# Patient Record
Sex: FEMALE | Race: AMERICAN INDIAN OR ALASKA NATIVE
[De-identification: names, ages, dates, MRNs, and addresses within clinical notes are randomized per-mention and may not be internally consistent; named-entity substitution may affect disease eponyms.]

---

## 2019-04-05 ENCOUNTER — HOSPITAL ENCOUNTER (EMERGENCY)
Dept: HOSPITAL 5 - ED | Age: 59
Discharge: HOME | End: 2019-04-05
Payer: COMMERCIAL

## 2019-04-05 VITALS — SYSTOLIC BLOOD PRESSURE: 138 MMHG | DIASTOLIC BLOOD PRESSURE: 93 MMHG
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
## 2019-04-05 DIAGNOSIS — Z90.710: ICD-10-CM
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
## 2019-04-05 DIAGNOSIS — Z98.890: ICD-10-CM

## 2019-04-05 DIAGNOSIS — I10: ICD-10-CM

## 2019-04-05 DIAGNOSIS — S16.1XXA: Primary | ICD-10-CM

## 2019-04-05 DIAGNOSIS — S39.012A: ICD-10-CM

## 2019-04-05 PROCEDURE — 72040 X-RAY EXAM NECK SPINE 2-3 VW: CPT

## 2019-04-05 PROCEDURE — 96372 THER/PROPH/DIAG INJ SC/IM: CPT

## 2019-04-05 PROCEDURE — 99283 EMERGENCY DEPT VISIT LOW MDM: CPT

## 2019-04-05 PROCEDURE — 72100 X-RAY EXAM L-S SPINE 2/3 VWS: CPT

## 2019-04-05 NOTE — XRAY REPORT
PROCEDURE: LUMBAR SPINE, 2 VIEWS 

 

TECHNIQUE:  Lumbar spine radiographs, frontal and lateral views. CPT 46710 

 

HISTORY: Back pain 

 

COMPARISONS:  None . 

 

FINDINGS: 

 

Alignment:  Normal . 

Vertebral body heights/Disk spaces:  Normal . 

Fracture(s):  None . 

Facets:  Normal . 

Bone mineralization:  Normal . 

 

IMPRESSION:  Normal Examination . 

 

This document is electronically signed by Ba Baer MD., April 5 2019 03:52:37 AM ET
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
## 2019-04-05 NOTE — XRAY REPORT
PROCEDURE: XR SPINE CERVICAL 2-3V 

 

TECHNIQUE:  Cervical spine complete, including AP, lateral, open-mouth odontoid, oblique and flexion 
and extension studies.  

 

HISTORY: neck pain 

 

COMPARISONS:  None . 

 

FINDINGS: 

 

Prevertebral soft tissues:  Normal . 

Alignment in neutral position:  Normal . 

Vertebral body movement with flexion and extension:   Physiologic . 

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 There is degenerative narro
wing of the disc spaces at C4-C5 and C5-C6. . 

Fracture(s):  None . 

Neural foramina:  Normal . 

Facets:  Normal  . 

Bone mineralization:  Normal . 

 

 

IMPRESSION:  There are no fractures or malalignments.   

 

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 There is degenerative narrowing of the disc spaces at C4-C5 and 
C5-C6. . 

 

This document is electronically signed by Ba Baer MD., April 5 2019 03:54:13 AM ET

## 2021-07-27 ENCOUNTER — HOSPITAL ENCOUNTER (EMERGENCY)
Dept: HOSPITAL 5 - ED | Age: 61
LOS: 1 days | Discharge: HOME | End: 2021-07-28
Payer: SELF-PAY

## 2021-07-27 VITALS — SYSTOLIC BLOOD PRESSURE: 148 MMHG | DIASTOLIC BLOOD PRESSURE: 98 MMHG

## 2021-07-27 DIAGNOSIS — S93.602A: Primary | ICD-10-CM

## 2021-07-27 DIAGNOSIS — Z79.899: ICD-10-CM

## 2021-07-27 DIAGNOSIS — Z98.890: ICD-10-CM
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
## 2021-07-27 DIAGNOSIS — I10: ICD-10-CM
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
## 2021-07-27 DIAGNOSIS — Z90.710: ICD-10-CM

## 2021-07-27 NOTE — XRAY REPORT
LEFT FOOT 3 VIEW(S)



INDICATION / CLINICAL INFORMATION: left foot pain and swelling unable to ambulate



COMPARISON: None available.

 

FINDINGS:



BONES / JOINT(S): No acute fracture or subluxation. No significant arthritis.



SOFT TISSUES: Moderate soft tissue swelling.



ADDITIONAL FINDINGS: None.







Signer Name: Alin Zelaya MD 

Signed: 7/27/2021 10:26 PM

Workstation Name: [id number]

## 2021-07-27 NOTE — EVENT NOTE
ED Screening Note


Date of service: 07/27/21


Time: 21:45


ED Screening Note: 


Patient 61-year-old female who presents with left foot pain x2 days.  States 

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  Started 

having foot pain 2 days ago now unable to ambulate.   Patient denies fall injury

or trauma.  There is no numbness tingling or myalgias.  Pain is rated at 7/10.  

Pain is exacerbated by attempted weight bearing.  Patient does have history of 

hypertension. 





This initial assessment/diagnostic orders/clinical plan/treatment(s) is/are 

subject to change based on patients health status, clinical progression and re-

assessment by fellow clinical providers in the ED. Further treatment and workup 

at subsequent clinical providers discretion. Patient/guardian urged not to elope

from the ED as their condition may be serious if not clinically assessed and 

managed. 





Initial orders include:

## 2021-07-28 NOTE — EMERGENCY DEPARTMENT REPORT
ED Lower Extremity HPI





- General


Chief Complaint: Extremity Injury, Lower


Stated Complaint: LEFT FOOT, LEG, ANKLE SWOLLEN


Time Seen by Provider: 07/28/21 00:50


Source: patient


Mode of arrival: Ambulatory


Limitations: No Limitations





- History of Present Illness


Initial Comments: 


Patient 61-year-old female who presents with left foot pain x2 days.  States 

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  Started 

having foot pain 2 days ago now unable to ambulate.   Patient denies fall injury

or trauma.  There is no numbness tingling or myalgias.  Pain is rated at 7/10.  

Pain is exacerbated by attempted weight bearing.  Patient does have history of 

hypertension. 











- Related Data


                                Home Medications











 Medication  Instructions  Recorded  Confirmed  Last Taken


 


hydroCHLOROthiazide [HCTZ] 25 mg PO QDAY 08/22/16 08/22/16 Unknown








                                  Previous Rx's











 Medication  Instructions  Recorded  Last Taken  Type


 


Cyclobenzaprine [Flexeril] 10 mg PO QHS PRN #10 tablet 04/05/19 Unknown Rx


 


Ibuprofen [Ibuprofen 800] 800 mg PO Q6HR PRN #20 tablet 04/05/19 Unknown Rx


 


Acetaminophen/Codeine [Tylenol 1 tab PO Q6H PRN #12 tab 07/28/21 Unknown Rx





/Codeine # 3 tab]    


 


predniSONE [Deltasone] 40 mg PO DAILY 5 Days #10 tab 07/28/21 Unknown Rx











                                    Allergies











Allergy/AdvReac Type Severity Reaction Status Date / Time


 


No Known Allergies Allergy   Unverified 08/22/16 10:53














ED Review of Systems


ROS: 


Stated complaint: LEFT FOOT, LEG, ANKLE SWOLLEN


Other details as noted in HPI





Constitutional: denies: chills, fever


Eyes: denies: eye pain, eye discharge, vision change


ENT: denies: ear pain, throat pain


Respiratory: denies: cough, shortness of breath, wheezing


Cardiovascular: denies: chest pain, palpitations


Endocrine: no symptoms reported


Gastrointestinal: denies: abdominal pain, nausea, diarrhea


Genitourinary: as per HPI


Musculoskeletal: joint swelling (left ankle)


Skin: denies: rash, lesions


Neurological: denies: headache, weakness, paresthesias


Psychiatric: denies: anxiety, depression


Hematological/Lymphatic: denies: easy bleeding, easy bruising





ED Past Medical Hx





- Past Medical History


Hx Hypertension: Yes


Hx Congestive Heart Failure: No


Hx Diabetes: No


Hx Asthma: No


Hx COPD: No


Hx HIV: No





- Surgical History


Additional Surgical History: Hysterectomy, Bilateral Cataract Removal





- Social History


Smoking Status: Never Smoker


Substance Use Type: None





- Medications


Home Medications: 


                                Home Medications











 Medication  Instructions  Recorded  Confirmed  Last Taken  Type


 


hydroCHLOROthiazide [HCTZ] 25 mg PO QDAY 08/22/16 08/22/16 Unknown History


 


Cyclobenzaprine [Flexeril] 10 mg PO QHS PRN #10 tablet 04/05/19  Unknown Rx


 


Ibuprofen [Ibuprofen 800] 800 mg PO Q6HR PRN #20 tablet 04/05/19  Unknown Rx


 


Acetaminophen/Codeine [Tylenol 1 tab PO Q6H PRN #12 tab 07/28/21  Unknown Rx





/Codeine # 3 tab]     


 


predniSONE [Deltasone] 40 mg PO DAILY 5 Days #10 tab 07/28/21  Unknown Rx














ED Physical Exam





- General


Limitations: No Limitations


General appearance: alert, in no apparent distress





- Head


Head exam: Present: atraumatic, normocephalic





- Eye


Eye exam: Present: normal appearance, EOMI


Pupils: Present: normal accommodation





- ENT


ENT exam: Present: mucous membranes moist





- Neck


Neck exam: Present: normal inspection, full ROM.  Absent: tenderness





- Respiratory


Respiratory exam: Present: normal lung sounds bilaterally.  Absent: wheezes





- Cardiovascular


Cardiovascular Exam: Present: regular rate, normal rhythm, normal heart sounds. 

 Absent: systolic murmur, diastolic murmur, rubs, gallop





- GI/Abdominal


GI/Abdominal exam: Present: soft, normal bowel sounds.  Absent: distended, 

tenderness





- Rectal


Rectal exam: Present: deferred





- Extremities Exam


Extremities exam: Present: normal inspection, full ROM, normal capillary refill





- Expanded Lower Extremity Exam


  ** Left


Ankle exam: Present: full ROM, tenderness, swelling.  Absent: abrasion, 

laceration, ecchymosis, deformity, crepidus, dislocation, erythema, anterior 

draw sign


Foot/Toe exam: Present: full ROM, tenderness, swelling.  Absent: abrasion, 

laceration, ecchymosis, deformity, crepidus, dislocation, erythema, amputation, 

puncture wound, foreign body, calcaneal tenderness, nail avulsion


Neuro vascular tendon exam: Absent: pulse deficit, motor deficit, sensory 

deficit, tendon deficit


Gait: Positive: observed and limited by pain





- Back Exam


Back exam: Present: normal inspection, full ROM.  Absent: vertebral tenderness





- Neurological Exam


Neurological exam: Present: alert, oriented X3, CN II-XII intact, abnormal gait 

(partial weight bearing ), reflexes normal.  Absent: motor sensory deficit





- Expanded Neurological Exam


  ** Expanded


Patient oriented to: Present: person, place, time


Speech: Present: fluid speech


Motor strength exam: RLE: 5, LLE: 5


DTR: ankle (R): 2+, ankle (L): 2+


Best Eye Response ([place]): (4) open spontaneously


Best Motor Response ([place]): (6) obeys commands


Best Verbal Response ([place]): (5) oriented


[place] Total: 15





- Psychiatric


Psychiatric exam: Present: normal affect, normal mood





- Skin


Skin exam: Present: warm, dry, intact, normal color.  Absent: rash





ED Course


                                   Vital Signs











  07/27/21





  21:40


 


Temperature 98.2 F


 


Pulse Rate 88


 


Respiratory 16





Rate 


 


Blood Pressure 148/98





[Left] 


 


O2 Sat by Pulse 98





Oximetry 














ED Lower Extremity MDM





- Radiology Data


Radiology results: report reviewed, image reviewed


Fluoro Time In Minutes: LEFT FOOT 3 VIEW(S) INDICATION / CLINICAL INFORMATION: 

left foot pain and swelling unable to ambulate 


COMPARISON: None available. 


FINDINGS: BONES / JOINT(S): No acute fracture or subluxation. No significant 

arthritis. 


SOFT TISSUES: Moderate soft tissue swelling. 


ADDITIONAL FINDINGS: None. 


Signer Name: Alin Lewis MD Signed: 7/27/2021 10:26 PM 


Workstation Name: [id number] 


Transcribed By: SB Dictated By: ALIN LEWIS MD


 Electronically Authenticated By: ALIN LEWIS MD 


Signed Date/Time: 07/27/21 2226 DD/DT: 07/27/21 2226 TD/TT: 








- Medical Decision Making


X-ray left foot and ankle no fracture moderate soft tissue swelling, distal 

pulses intact CRT less than 3 seconds dorsal and plantar flexion intact without 

restraint.  Negative Sol's test, mild pain with rotation.  Patient is 

partial weightbearing.  Plan Ace wrap, crutches, rice therapy, NSAIDs as needed 

pain, follow-up primary care doctor in 3 to 4 days.  Patient verbalized 

agreement and understanding discharge plan.  Patient DC'd home in stable conditi

on at this time.





Critical care attestation.: 


If time is entered above; I have spent that time in minutes in the direct care 

of this critically ill patient, excluding procedure time.








ED Disposition


Clinical Impression: 


Foot sprain


Qualifiers:


 Encounter type: initial encounter Laterality: left Qualified Code(s): S93.602A 

- Unspecified sprain of left foot, initial encounter





Disposition: DC-01 TO HOME OR SELFCARE


Is pt being admited?: No


Does the pt Need Aspirin: No


Condition: Stable


Instructions:  Elastic Bandage and RICE Therapy, Foot Sprain


Additional Instructions: 


Take medications as prescribed, follow-up primary care doctor in 3 to 4 days.  

Return to emergency room should symptoms worsen. 


Prescriptions: 


predniSONE [Deltasone] 40 mg PO DAILY 5 Days #10 tab


Acetaminophen/Codeine [Tylenol /Codeine # 3 tab] 1 tab PO Q6H PRN #12 tab


 PRN Reason: Pain


Referrals: 


[facility] [Primary Care Provider] - 3-5 Days